# Patient Record
Sex: FEMALE | URBAN - METROPOLITAN AREA
[De-identification: names, ages, dates, MRNs, and addresses within clinical notes are randomized per-mention and may not be internally consistent; named-entity substitution may affect disease eponyms.]

---

## 2020-03-30 ENCOUNTER — TELEPHONE (OUTPATIENT)
Dept: OPHTHALMOLOGY | Facility: CLINIC | Age: 75
End: 2020-03-30

## 2020-03-30 NOTE — TELEPHONE ENCOUNTER
----- Message from Kacy Whitehead sent at 3/30/2020 11:29 AM CDT -----  Contact: Issac Davidson(pt son calling on her behalf)  Issac states he received a phone call from Dr. Vail's office but I wasn't able to pull up any information to verify the reason for the call. Pt son can be [phone].